# Patient Record
Sex: FEMALE | Race: WHITE | NOT HISPANIC OR LATINO
[De-identification: names, ages, dates, MRNs, and addresses within clinical notes are randomized per-mention and may not be internally consistent; named-entity substitution may affect disease eponyms.]

---

## 2017-01-09 ENCOUNTER — APPOINTMENT (OUTPATIENT)
Dept: UROLOGY | Facility: CLINIC | Age: 72
End: 2017-01-09

## 2017-01-09 DIAGNOSIS — R31.29 OTHER MICROSCOPIC HEMATURIA: ICD-10-CM

## 2017-10-05 ENCOUNTER — RESULT REVIEW (OUTPATIENT)
Age: 72
End: 2017-10-05

## 2018-06-26 ENCOUNTER — APPOINTMENT (OUTPATIENT)
Dept: INTERNAL MEDICINE | Facility: CLINIC | Age: 73
End: 2018-06-26
Payer: COMMERCIAL

## 2018-06-26 ENCOUNTER — NON-APPOINTMENT (OUTPATIENT)
Age: 73
End: 2018-06-26

## 2018-06-26 VITALS
DIASTOLIC BLOOD PRESSURE: 70 MMHG | WEIGHT: 142 LBS | TEMPERATURE: 97.6 F | BODY MASS INDEX: 23.66 KG/M2 | SYSTOLIC BLOOD PRESSURE: 130 MMHG | HEIGHT: 65 IN

## 2018-06-26 DIAGNOSIS — Z87.19 PERSONAL HISTORY OF OTHER DISEASES OF THE DIGESTIVE SYSTEM: ICD-10-CM

## 2018-06-26 DIAGNOSIS — I44.7 LEFT BUNDLE-BRANCH BLOCK, UNSPECIFIED: ICD-10-CM

## 2018-06-26 DIAGNOSIS — C44.92 SQUAMOUS CELL CARCINOMA OF SKIN, UNSPECIFIED: ICD-10-CM

## 2018-06-26 DIAGNOSIS — F41.9 ANXIETY DISORDER, UNSPECIFIED: ICD-10-CM

## 2018-06-26 DIAGNOSIS — F42.9 OBSESSIVE-COMPULSIVE DISORDER, UNSPECIFIED: ICD-10-CM

## 2018-06-26 DIAGNOSIS — Z01.818 ENCOUNTER FOR OTHER PREPROCEDURAL EXAMINATION: ICD-10-CM

## 2018-06-26 PROCEDURE — 36415 COLL VENOUS BLD VENIPUNCTURE: CPT

## 2018-06-26 PROCEDURE — 93000 ELECTROCARDIOGRAM COMPLETE: CPT

## 2018-06-26 PROCEDURE — 99214 OFFICE O/P EST MOD 30 MIN: CPT | Mod: 25

## 2018-06-26 RX ORDER — AMOXICILLIN 500 MG/1
500 CAPSULE ORAL
Qty: 21 | Refills: 0 | Status: DISCONTINUED | COMMUNITY
Start: 2018-05-15

## 2018-06-26 RX ORDER — ALPRAZOLAM 0.25 MG/1
0.25 TABLET ORAL
Refills: 0 | Status: DISCONTINUED | COMMUNITY
End: 2018-06-26

## 2018-06-26 RX ORDER — CLOMIPRAMINE HYDROCHLORIDE 50 MG/1
50 CAPSULE ORAL AT BEDTIME
Refills: 0 | Status: ACTIVE | COMMUNITY

## 2018-06-26 RX ORDER — OXYCODONE AND ACETAMINOPHEN 5; 325 MG/1; MG/1
5-325 TABLET ORAL
Qty: 10 | Refills: 0 | Status: DISCONTINUED | COMMUNITY
Start: 2018-05-15

## 2018-06-26 RX ORDER — BUSPIRONE HYDROCHLORIDE 7.5 MG/1
TABLET ORAL
Refills: 0 | Status: ACTIVE | COMMUNITY

## 2018-06-26 RX ORDER — VENLAFAXINE 37.5 MG/1
TABLET ORAL
Refills: 0 | Status: ACTIVE | COMMUNITY

## 2018-06-27 LAB
ANION GAP SERPL CALC-SCNC: 14 MMOL/L
BASOPHILS # BLD AUTO: 0.05 K/UL
BASOPHILS NFR BLD AUTO: 1 %
BUN SERPL-MCNC: 17 MG/DL
CALCIUM SERPL-MCNC: 9 MG/DL
CHLORIDE SERPL-SCNC: 99 MMOL/L
CO2 SERPL-SCNC: 26 MMOL/L
CREAT SERPL-MCNC: 0.83 MG/DL
EOSINOPHIL # BLD AUTO: 0.28 K/UL
EOSINOPHIL NFR BLD AUTO: 5.4 %
GLUCOSE SERPL-MCNC: 95 MG/DL
HCT VFR BLD CALC: 37.3 %
HGB BLD-MCNC: 11.5 G/DL
IMM GRANULOCYTES NFR BLD AUTO: 0.2 %
LYMPHOCYTES # BLD AUTO: 0.97 K/UL
LYMPHOCYTES NFR BLD AUTO: 18.6 %
MAN DIFF?: NORMAL
MCHC RBC-ENTMCNC: 25.2 PG
MCHC RBC-ENTMCNC: 30.8 GM/DL
MCV RBC AUTO: 81.6 FL
MONOCYTES # BLD AUTO: 0.32 K/UL
MONOCYTES NFR BLD AUTO: 6.1 %
NEUTROPHILS # BLD AUTO: 3.59 K/UL
NEUTROPHILS NFR BLD AUTO: 68.7 %
PLATELET # BLD AUTO: 237 K/UL
POTASSIUM SERPL-SCNC: 4 MMOL/L
RBC # BLD: 4.57 M/UL
RBC # FLD: 15.5 %
SODIUM SERPL-SCNC: 139 MMOL/L
WBC # FLD AUTO: 5.22 K/UL

## 2018-06-27 NOTE — ASSESSMENT
[Patient Optimized for Surgery] : Patient optimized for surgery [No Further Testing Recommended] : no further testing recommended [As per surgery] : as per surgery [FreeTextEntry7] : Hold NSAIDs 1 week prior

## 2018-06-27 NOTE — HISTORY OF PRESENT ILLNESS
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 She needs to schedule reconstructive surgery after medical clearance is done.

## 2018-06-27 NOTE — PHYSICAL EXAM
[No Acute Distress] : no acute distress [Well Nourished] : well nourished [Well Developed] : well developed [Well-Appearing] : well-appearing [Normal Sclera/Conjunctiva] : normal sclera/conjunctiva [PERRL] : pupils equal round and reactive to light [Normal Outer Ear/Nose] : the outer ears and nose were normal in appearance [Normal Oropharynx] : the oropharynx was normal [No JVD] : no jugular venous distention [Supple] : supple [No Lymphadenopathy] : no lymphadenopathy [No Respiratory Distress] : no respiratory distress  [Clear to Auscultation] : lungs were clear to auscultation bilaterally [No Accessory Muscle Use] : no accessory muscle use [Normal Rate] : normal rate  [Regular Rhythm] : with a regular rhythm [Normal S1, S2] : normal S1 and S2 [No Murmur] : no murmur heard [Pedal Pulses Present] : the pedal pulses are present [No Edema] : there was no peripheral edema [No Extremity Clubbing/Cyanosis] : no extremity clubbing/cyanosis [Soft] : abdomen soft [Non Tender] : non-tender [Non-distended] : non-distended [Normal Bowel Sounds] : normal bowel sounds [Normal Posterior Cervical Nodes] : no posterior cervical lymphadenopathy [Normal Anterior Cervical Nodes] : no anterior cervical lymphadenopathy [No Joint Swelling] : no joint swelling [Grossly Normal Strength/Tone] : grossly normal strength/tone [No Rash] : no rash [Normal Gait] : normal gait [Coordination Grossly Intact] : coordination grossly intact [No Focal Deficits] : no focal deficits [Deep Tendon Reflexes (DTR)] : deep tendon reflexes were 2+ and symmetric [Normal Affect] : the affect was normal [Normal Insight/Judgement] : insight and judgment were intact [de-identified] : scalp dressin gin place

## 2018-12-18 ENCOUNTER — RESULT REVIEW (OUTPATIENT)
Age: 73
End: 2018-12-18

## 2019-02-20 ENCOUNTER — NON-APPOINTMENT (OUTPATIENT)
Age: 74
End: 2019-02-20

## 2019-02-20 ENCOUNTER — LABORATORY RESULT (OUTPATIENT)
Age: 74
End: 2019-02-20

## 2019-02-20 ENCOUNTER — APPOINTMENT (OUTPATIENT)
Dept: INTERNAL MEDICINE | Facility: CLINIC | Age: 74
End: 2019-02-20
Payer: COMMERCIAL

## 2019-02-20 VITALS
WEIGHT: 157 LBS | TEMPERATURE: 98.2 F | DIASTOLIC BLOOD PRESSURE: 70 MMHG | SYSTOLIC BLOOD PRESSURE: 118 MMHG | HEART RATE: 95 BPM | OXYGEN SATURATION: 98 % | HEIGHT: 65 IN | BODY MASS INDEX: 26.16 KG/M2

## 2019-02-20 DIAGNOSIS — Z00.00 ENCOUNTER FOR GENERAL ADULT MEDICAL EXAMINATION W/OUT ABNORMAL FINDINGS: ICD-10-CM

## 2019-02-20 DIAGNOSIS — Z11.59 ENCOUNTER FOR SCREENING FOR OTHER VIRAL DISEASES: ICD-10-CM

## 2019-02-20 PROCEDURE — 93000 ELECTROCARDIOGRAM COMPLETE: CPT

## 2019-02-20 PROCEDURE — 36415 COLL VENOUS BLD VENIPUNCTURE: CPT

## 2019-02-20 PROCEDURE — 99397 PER PM REEVAL EST PAT 65+ YR: CPT | Mod: 25

## 2019-02-20 RX ORDER — LURASIDONE HYDROCHLORIDE 40 MG/1
40 TABLET, FILM COATED ORAL
Qty: 90 | Refills: 0 | Status: DISCONTINUED | COMMUNITY
Start: 2018-05-28 | End: 2019-02-20

## 2019-02-20 RX ORDER — CALCIUM CITRATE/VITAMIN D3 315MG-6.25
TABLET ORAL
Refills: 0 | Status: ACTIVE | COMMUNITY

## 2019-02-20 RX ORDER — BIMATOPROST 0.1 MG/ML
0.01 SOLUTION/ DROPS OPHTHALMIC
Refills: 0 | Status: ACTIVE | COMMUNITY

## 2019-02-20 NOTE — PLAN
[FreeTextEntry1] : Check sugar, chol, thyroid and hep C.\par HM is UTD.\par Rec cardio eval every 3 years.\par She declines any xray or PT for hip pain.

## 2019-02-20 NOTE — HISTORY OF PRESENT ILLNESS
[FreeTextEntry1] : physical [de-identified] : Saw cardio less than 2 years ago.\par Derm is UTD.\par Walks for exercise.\par Has right hip pain when lying on her back at night.  It's on the outside of her hip.  Comes/goes.  Has it once weekly.

## 2019-02-25 ENCOUNTER — RESULT REVIEW (OUTPATIENT)
Age: 74
End: 2019-02-25

## 2019-02-25 DIAGNOSIS — E03.9 HYPOTHYROIDISM, UNSPECIFIED: ICD-10-CM

## 2019-02-25 LAB
ALBUMIN SERPL ELPH-MCNC: 4.3 G/DL
ALP BLD-CCNC: 80 U/L
ALT SERPL-CCNC: 26 U/L
ANION GAP SERPL CALC-SCNC: 12 MMOL/L
APPEARANCE: CLEAR
AST SERPL-CCNC: 25 U/L
BACTERIA: NEGATIVE
BASOPHILS # BLD AUTO: 0.09 K/UL
BASOPHILS NFR BLD AUTO: 2 %
BILIRUB SERPL-MCNC: 0.3 MG/DL
BILIRUBIN URINE: NEGATIVE
BLOOD URINE: NEGATIVE
BUN SERPL-MCNC: 14 MG/DL
CALCIUM SERPL-MCNC: 9.1 MG/DL
CHLORIDE SERPL-SCNC: 106 MMOL/L
CHOLEST SERPL-MCNC: 222 MG/DL
CHOLEST/HDLC SERPL: 3.1 RATIO
CO2 SERPL-SCNC: 26 MMOL/L
COLOR: YELLOW
CREAT SERPL-MCNC: 0.69 MG/DL
EOSINOPHIL # BLD AUTO: 0.6 K/UL
EOSINOPHIL NFR BLD AUTO: 13.6 %
GLUCOSE QUALITATIVE U: NEGATIVE
GLUCOSE SERPL-MCNC: 92 MG/DL
HBA1C MFR BLD HPLC: 5.6 %
HCT VFR BLD CALC: 42.8 %
HCV AB SER QL: NONREACTIVE
HCV S/CO RATIO: 0.13 S/CO
HDLC SERPL-MCNC: 72 MG/DL
HGB BLD-MCNC: 12.4 G/DL
HYALINE CASTS: 0 /LPF
IMM GRANULOCYTES NFR BLD AUTO: 0.2 %
KETONES URINE: NEGATIVE
LDLC SERPL CALC-MCNC: 132 MG/DL
LEUKOCYTE ESTERASE URINE: NEGATIVE
LYMPHOCYTES # BLD AUTO: 0.68 K/UL
LYMPHOCYTES NFR BLD AUTO: 15.4 %
MAN DIFF?: NORMAL
MCHC RBC-ENTMCNC: 24 PG
MCHC RBC-ENTMCNC: 29 GM/DL
MCV RBC AUTO: 82.9 FL
MICROSCOPIC-UA: NORMAL
MONOCYTES # BLD AUTO: 0.4 K/UL
MONOCYTES NFR BLD AUTO: 9 %
NEUTROPHILS # BLD AUTO: 2.64 K/UL
NEUTROPHILS NFR BLD AUTO: 59.8 %
NITRITE URINE: NEGATIVE
PH URINE: 8
PLATELET # BLD AUTO: 276 K/UL
POTASSIUM SERPL-SCNC: 4.9 MMOL/L
PROT SERPL-MCNC: 6.4 G/DL
PROTEIN URINE: NORMAL
RBC # BLD: 5.16 M/UL
RBC # FLD: 14.8 %
RED BLOOD CELLS URINE: 4 /HPF
SODIUM SERPL-SCNC: 144 MMOL/L
SPECIFIC GRAVITY URINE: 1.02
SQUAMOUS EPITHELIAL CELLS: 1 /HPF
TRIGL SERPL-MCNC: 90 MG/DL
TSH SERPL-ACNC: 7.99 UIU/ML
UROBILINOGEN URINE: NORMAL
VIT B12 SERPL-MCNC: 506 PG/ML
WBC # FLD AUTO: 4.42 K/UL
WHITE BLOOD CELLS URINE: 1 /HPF

## 2019-03-11 ENCOUNTER — LABORATORY RESULT (OUTPATIENT)
Age: 74
End: 2019-03-11

## 2019-03-12 LAB
CHOLEST SERPL-MCNC: 195 MG/DL
CHOLEST/HDLC SERPL: 2.8 RATIO
ENDOMYSIUM IGA SER QL: NEGATIVE
ENDOMYSIUM IGA TITR SER: NORMAL
GLIADIN IGA SER QL: <5 UNITS
GLIADIN IGG SER QL: <5 UNITS
GLIADIN PEPTIDE IGA SER-ACNC: NEGATIVE
GLIADIN PEPTIDE IGG SER-ACNC: NEGATIVE
HDLC SERPL-MCNC: 69 MG/DL
IGA SER QL IEP: 131 MG/DL
LDLC SERPL CALC-MCNC: 118 MG/DL
T3FREE SERPL-MCNC: 2.7 PG/ML
T4 FREE SERPL-MCNC: 1.2 NG/DL
THYROGLOB AB SERPL-ACNC: <20 IU/ML
THYROPEROXIDASE AB SERPL IA-ACNC: <10 IU/ML
TRIGL SERPL-MCNC: 41 MG/DL
TSH SERPL-ACNC: 4.55 UIU/ML
TTG IGA SER IA-ACNC: <1.2 U/ML
TTG IGA SER-ACNC: NEGATIVE
TTG IGG SER IA-ACNC: 1.5 U/ML
TTG IGG SER IA-ACNC: NEGATIVE

## 2019-03-12 RX ORDER — LEVOTHYROXINE SODIUM 0.03 MG/1
25 TABLET ORAL
Qty: 30 | Refills: 5 | Status: DISCONTINUED | COMMUNITY
Start: 2019-02-25 | End: 2019-03-12

## 2019-03-19 LAB
DEPRECATED WHEAT IGE RAST QL: 0
WHEAT IGE QN: <0.1 KUA/L

## 2019-04-17 ENCOUNTER — APPOINTMENT (OUTPATIENT)
Dept: INTERNAL MEDICINE | Facility: CLINIC | Age: 74
End: 2019-04-17

## 2019-04-18 ENCOUNTER — APPOINTMENT (OUTPATIENT)
Dept: ENDOCRINOLOGY | Facility: CLINIC | Age: 74
End: 2019-04-18
Payer: COMMERCIAL

## 2019-04-18 VITALS
BODY MASS INDEX: 25.66 KG/M2 | OXYGEN SATURATION: 98 % | DIASTOLIC BLOOD PRESSURE: 82 MMHG | SYSTOLIC BLOOD PRESSURE: 134 MMHG | HEIGHT: 65 IN | HEART RATE: 89 BPM | WEIGHT: 154 LBS

## 2019-04-18 DIAGNOSIS — Z13.820 ENCOUNTER FOR SCREENING FOR OSTEOPOROSIS: ICD-10-CM

## 2019-04-18 DIAGNOSIS — Z82.0 FAMILY HISTORY OF EPILEPSY AND OTHER DISEASES OF THE NERVOUS SYSTEM: ICD-10-CM

## 2019-04-18 DIAGNOSIS — Z82.49 FAMILY HISTORY OF ISCHEMIC HEART DISEASE AND OTHER DISEASES OF THE CIRCULATORY SYSTEM: ICD-10-CM

## 2019-04-18 PROCEDURE — 99214 OFFICE O/P EST MOD 30 MIN: CPT

## 2019-04-18 RX ORDER — BIOTIN 10 MG
TABLET ORAL
Refills: 0 | Status: ACTIVE | COMMUNITY

## 2019-04-18 NOTE — CONSULT LETTER
[Dear  ___] : Dear  [unfilled], [Consult Letter:] : I had the pleasure of evaluating your patient, [unfilled]. [Sincerely,] : Sincerely, [Consult Closing:] : Thank you very much for allowing me to participate in the care of this patient.  If you have any questions, please do not hesitate to contact me. [Please see my note below.] : Please see my note below. [FreeTextEntry3] : Marysol Larsen MD

## 2019-04-18 NOTE — ASSESSMENT
[FreeTextEntry1] : Patient is a 74 yo woman here for the evaluation of subclinical hypothyroidism\par \par 1. Subclinical hypothyroid\par -patient has no significant symptoms of hypothyroidism other than dry skin. She does take biotin which may have interacted with thyroid lab assays\par -for now she prefers not to take medicines\par -note that TSH increases with age as well\par -discussed that if there are no symptoms and she feels well, can monitor off medication. Educated about symptoms of hypothyroidism including fatigue, weight gain, cold intolerance\par -repeat TFTs in 4 months, do not take biotin 2-3 days before blood work\par thyroid antibodies were negative\par \par 2. Osteoporosis, screening\par -patient has had surveillance DXA by GYN\par -asked her to sent us report\par \par Follow up in 4 months

## 2019-04-18 NOTE — HISTORY OF PRESENT ILLNESS
[FreeTextEntry1] : Patient is a 74 yo woman with depression here for the evaluation of subclinical hypothyroidism\par \par Patient had TFT's in February and TSH was 7.99.  Other than brittle nails/dry skin has no other symptoms.  Weight is stable, no constipation, bowel movements are regular, appetite is good.  No cold intolerance.  Patient is active and diet is healthy.  PCP rechecked TFT's and TSH improved to 4.55 March 12.  Levothyroxine 25 mcg was recommended but after Googling and lack of symptoms, she deferred taking medicine\par Takes on pill of biotin daily\par No exposures to lithium/amiodarone/radiation therapy \par No family of thyroid problems\par \par DXA checked by GYN "regularly" denies any history of osteoporosis.  Broke finger walking dog, but no clinical fractures

## 2019-04-18 NOTE — REVIEW OF SYSTEMS
[Fatigue] : no fatigue [Decreased Appetite] : appetite not decreased [Visual Field Defect] : no visual field defect [Blurry Vision] : no blurred vision [Chest Pain] : no chest pain [Palpitations] : no palpitations [Heart Rate Is Slow] : the heart rate was not slow [Heart Rate Is Fast] : the heart rate was not fast [Wheezing] : no wheezing was heard [Shortness Of Breath] : no shortness of breath [Cough] : no cough [SOB on Exertion] : no shortness of breath during exertion [Nausea] : no nausea [Vomiting] : no vomiting was observed [Constipation] : no constipation [Joint Pain] : no joint pain [Diarrhea] : no diarrhea [Joint Stiffness] : no joint stiffness [Headache] : no headaches [Tremors] : no tremors [Depression] : no depression [Cold Intolerance] : cold tolerant [Anxiety] : no anxiety [Negative] : ENT [Heat Intolerance] : heat tolerant [All other systems negative] : All other systems negative

## 2019-04-18 NOTE — PHYSICAL EXAM
[Alert] : alert [No Acute Distress] : no acute distress [Well Developed] : well developed [EOMI] : extra ocular movement intact [Thyroid Not Enlarged] : the thyroid was not enlarged [Normal Hearing] : hearing was normal [No Respiratory Distress] : no respiratory distress [No Accessory Muscle Use] : no accessory muscle use [Normal Rate and Effort] : normal respiratory rhythm and effort [Clear to Auscultation] : lungs were clear to auscultation bilaterally [Normal Rate] : heart rate was normal  [Regular Rhythm] : with a regular rhythm [Normal S1, S2] : normal S1 and S2 [Soft] : abdomen soft [Normal Bowel Sounds] : normal bowel sounds [Not Tender] : non-tender [No Stigmata of Cushings Syndrome] : no stigmata of cushings syndrome [No Joint Swelling] : no joint swelling seen [Normal Strength/Tone] : muscle strength and tone were normal [No Clubbing, Cyanosis] : no clubbing  or cyanosis of the fingernails [Foot Ulcers] : no foot ulcers [Normal Reflexes] : deep tendon reflexes were 2+ and symmetric [Acne] : no acne [No Tremors] : no tremors [No Motor Deficits] : the motor exam was normal [de-identified] : Dry skin

## 2019-08-27 ENCOUNTER — APPOINTMENT (OUTPATIENT)
Dept: ENDOCRINOLOGY | Facility: CLINIC | Age: 74
End: 2019-08-27
Payer: COMMERCIAL

## 2019-08-27 VITALS
WEIGHT: 156 LBS | BODY MASS INDEX: 25.99 KG/M2 | SYSTOLIC BLOOD PRESSURE: 134 MMHG | HEIGHT: 65 IN | HEART RATE: 95 BPM | OXYGEN SATURATION: 98 % | DIASTOLIC BLOOD PRESSURE: 80 MMHG

## 2019-08-27 PROCEDURE — 99214 OFFICE O/P EST MOD 30 MIN: CPT

## 2019-08-27 NOTE — ASSESSMENT
[FreeTextEntry1] : Patient is a 73 yo woman here for the evaluation of subclinical hypothyroidism\par \par 1. Subclinical hypothyroid\par -patient has no significant symptoms of hypothyroidism other than dry skin and brittle nails. Finds biotin is not helpful\par -discussed that if there are no symptoms and she feels well, can monitor off medication. Educated about symptoms of hypothyroidism including fatigue, weight gain, cold intolerance.  At today's visit, patient was amenable to started thyroid medication specifically for hair and nails\par -repeat TFTs today and if subclinical hypothyroidism persists, start Lt4 25 mcg\par \par 2. Osteopenia\par -patient has had surveillance DXA by GYN 2017\par -repeat DXA November 2020\par \par Follow up in 6 months. \par \par  [Levothyroxine] : The patient was instructed to take Levothyroxine on an empty stomach, separate from vitamins, and wait at least 30 minutes before eating

## 2019-08-27 NOTE — PHYSICAL EXAM
[No Acute Distress] : no acute distress [Alert] : alert [Well Nourished] : well nourished [EOMI] : extra ocular movement intact [Well Developed] : well developed [Normal Hearing] : hearing was normal [No Proptosis] : no proptosis [Thyroid Not Enlarged] : the thyroid was not enlarged [No Respiratory Distress] : no respiratory distress [Normal Rate and Effort] : normal respiratory rhythm and effort [No Accessory Muscle Use] : no accessory muscle use [Clear to Auscultation] : lungs were clear to auscultation bilaterally [Normal Rate] : heart rate was normal  [Normal S1, S2] : normal S1 and S2 [Normal Bowel Sounds] : normal bowel sounds [Regular Rhythm] : with a regular rhythm [Not Tender] : non-tender [Soft] : abdomen soft [Normal Gait] : normal gait [No Joint Swelling] : no joint swelling seen [Normal Affect] : the affect was normal [No Motor Deficits] : the motor exam was normal [Normal Mood] : the mood was normal [de-identified] : brittle nails

## 2019-08-27 NOTE — HISTORY OF PRESENT ILLNESS
[FreeTextEntry1] : story of Present Illness\par Patient is a 72 yo woman with depression here for the evaluation of subclinical hypothyroidism\par \par Patient had TFT's in February and TSH was 7.99. Other than brittle nails/dry skin has no other symptoms. Weight is stable, no constipation, bowel movements are regular, appetite is good. No cold intolerance. Patient is active and diet is healthy. PCP rechecked TFT's and TSH improved to 4.55 March 2019. Levothyroxine 25 mcg was recommended but after Googling and lack of symptoms, she deferred taking medicine\par Takes biotin daily but did not take it prior to this visit\par No exposures to lithium/amiodarone/radiation therapy \par No family of thyroid problems\par \par DXA checked by GYN "regularly" denies any history of osteoporosis. Broke finger walking dog, but no clinical fractures. 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

## 2019-08-27 NOTE — REVIEW OF SYSTEMS
[Negative] : Eyes [All other systems negative] : All other systems negative [Fatigue] : no fatigue [Decreased Appetite] : appetite not decreased [Dysphagia] : no dysphagia [Dysphonia] : no dysphonia [Neck Pain] : no neck pain [Nasal Congestion] : no nasal congestion [Chest Pain] : no chest pain [Palpitations] : no palpitations [Heart Rate Is Slow] : the heart rate was not slow [Heart Rate Is Fast] : the heart rate was not fast [Shortness Of Breath] : no shortness of breath [Wheezing] : no wheezing was heard [Cough] : no cough [SOB on Exertion] : no shortness of breath during exertion [Nausea] : no nausea [Vomiting] : no vomiting was observed [Constipation] : no constipation [Diarrhea] : no diarrhea [Joint Stiffness] : no joint stiffness [Joint Pain] : no joint pain [Hair Loss] : hair loss [Dry Skin] : dry skin [Headache] : no headaches [Tremors] : no tremors [Depression] : no depression [Anxiety] : no anxiety [Heat Intolerance] : heat tolerant [Cold Intolerance] : cold tolerant [de-identified] : brittle nails

## 2019-08-29 LAB
T4 FREE SERPL-MCNC: 0.9 NG/DL
TSH SERPL-ACNC: 5.54 UIU/ML

## 2019-12-15 ENCOUNTER — RESULT REVIEW (OUTPATIENT)
Age: 74
End: 2019-12-15

## 2020-02-20 ENCOUNTER — APPOINTMENT (OUTPATIENT)
Dept: ENDOCRINOLOGY | Facility: CLINIC | Age: 75
End: 2020-02-20
Payer: COMMERCIAL

## 2020-02-20 VITALS
OXYGEN SATURATION: 98 % | WEIGHT: 166 LBS | HEART RATE: 101 BPM | BODY MASS INDEX: 28.34 KG/M2 | HEIGHT: 64 IN | DIASTOLIC BLOOD PRESSURE: 82 MMHG | SYSTOLIC BLOOD PRESSURE: 140 MMHG

## 2020-02-20 DIAGNOSIS — M85.80 OTHER SPECIFIED DISORDERS OF BONE DENSITY AND STRUCTURE, UNSPECIFIED SITE: ICD-10-CM

## 2020-02-20 DIAGNOSIS — E03.9 HYPOTHYROIDISM, UNSPECIFIED: ICD-10-CM

## 2020-02-20 PROCEDURE — 99214 OFFICE O/P EST MOD 30 MIN: CPT

## 2020-02-20 RX ORDER — LEVOTHYROXINE SODIUM 0.03 MG/1
25 TABLET ORAL DAILY
Qty: 1 | Refills: 0 | Status: DISCONTINUED | COMMUNITY
Start: 2019-08-29 | End: 2020-02-20

## 2020-02-21 LAB
25(OH)D3 SERPL-MCNC: 37.4 NG/ML
ESTIMATED AVERAGE GLUCOSE: 111 MG/DL
HBA1C MFR BLD HPLC: 5.5 %
T4 FREE SERPL-MCNC: 1 NG/DL
TSH SERPL-ACNC: 5.69 UIU/ML

## 2020-02-23 NOTE — PHYSICAL EXAM
[Well Nourished] : well nourished [Alert] : alert [Well Developed] : well developed [EOMI] : extra ocular movement intact [No Proptosis] : no proptosis [Normal Hearing] : hearing was normal [Thyroid Not Enlarged] : the thyroid was not enlarged [No Thyroid Nodules] : there were no palpable thyroid nodules [No Respiratory Distress] : no respiratory distress [Normal Rate and Effort] : normal respiratory rhythm and effort [No Accessory Muscle Use] : no accessory muscle use [Clear to Auscultation] : lungs were clear to auscultation bilaterally [Normal Rate] : heart rate was normal  [Normal S1, S2] : normal S1 and S2 [Regular Rhythm] : with a regular rhythm [Not Tender] : non-tender [Normal Bowel Sounds] : normal bowel sounds [Normal Gait] : normal gait [No Joint Swelling] : no joint swelling seen [Soft] : abdomen soft [Normal Strength/Tone] : muscle strength and tone were normal [Normal Insight/Judgement] : insight and judgment were intact [No Motor Deficits] : the motor exam was normal [Normal Affect] : the affect was normal [Normal Mood] : the mood was normal [de-identified] : Dry skin and brittle nails

## 2020-02-23 NOTE — ASSESSMENT
[FreeTextEntry1] : Patient is a 75 yo woman with subclinical hypothyroidism and osteopenia\par \par 1. Subclinical hypothyroid\par -the patient still states symptoms of decrease hair and brittle nails. She was started on Lt4 25 mcg to see if there would be symptomatic improvement but states no changes in symptoms\par -repeat thyroid levels today and will do a trial off of levothyroxine as patient prefers to be on medicines that are essential only\par -repeat thyroid function tests in 3-4 months or sooner should there be interval symptoms\par \par 2. Osteopenia\par -she is without interval fractures\par -states she takes calcium + d for supplementation\par -she requires repeat bone density November 2020 as the last DXA which was reviewed was in 2017\par \par Follow up in four months

## 2020-02-23 NOTE — HISTORY OF PRESENT ILLNESS
[FreeTextEntry1] : Patient is a 75 yo woman with depression here for the evaluation of subclinical hypothyroidism\par \par Patient had TFT's in February 2019 which showed that her TSH was 7.99. Other than brittle nails/dry skin has no other symptoms. Weight is stable, no constipation, bowel movements are regular, appetite is good. No cold intolerance. Patient is active and diet is healthy. PCP rechecked TFT's and TSH improved to 4.55 March 2019. Levothyroxine 25 mcg was recommended but after Googling and lack of symptoms, she deferred taking medicine. Was amenable to starting levothyroxine 25 mcg at the last visit\par Takes biotin daily but did not seem to help so stopped about one week ago\par No exposures to lithium/amiodarone/radiation therapy \par No family of thyroid problems\par \par DXA checked by GYN "regularly" denies any history of osteoporosis. Broke finger walking dog, but no clinical fractures. Received DXA from November 2017\par LS Spine T score +0.2\par L FN -1.5\par R FN -1.1\par L total -1.0\par R total -0.6\par No interval fractures\par Takes calcium and vitamin D supplements

## 2020-02-23 NOTE — REVIEW OF SYSTEMS
[Negative] : Eyes [Anxiety] : anxiety [Fatigue] : no fatigue [Decreased Appetite] : appetite not decreased [Dysphonia] : no dysphonia [Dysphagia] : no dysphagia [Chest Pain] : no chest pain [Palpitations] : no palpitations [Shortness Of Breath] : no shortness of breath [Heart Rate Is Slow] : the heart rate was not slow [Heart Rate Is Fast] : the heart rate was not fast [Wheezing] : no wheezing was heard [Cough] : no cough [SOB on Exertion] : no shortness of breath during exertion [Nausea] : no nausea [Vomiting] : no vomiting was observed [Constipation] : no constipation [Diarrhea] : no diarrhea [Depression] : no depression

## 2020-07-17 ENCOUNTER — APPOINTMENT (OUTPATIENT)
Dept: ENDOCRINOLOGY | Facility: CLINIC | Age: 75
End: 2020-07-17

## 2020-08-11 ENCOUNTER — RESULT REVIEW (OUTPATIENT)
Age: 75
End: 2020-08-11

## 2023-03-06 ENCOUNTER — OFFICE (OUTPATIENT)
Dept: URBAN - METROPOLITAN AREA CLINIC 109 | Facility: CLINIC | Age: 78
Setting detail: OPHTHALMOLOGY
End: 2023-03-06
Payer: MEDICARE

## 2023-03-06 DIAGNOSIS — H26.493: ICD-10-CM

## 2023-03-06 DIAGNOSIS — H40.1131: ICD-10-CM

## 2023-03-06 DIAGNOSIS — H16.221: ICD-10-CM

## 2023-03-06 PROCEDURE — 92014 COMPRE OPH EXAM EST PT 1/>: CPT | Performed by: OPHTHALMOLOGY

## 2023-03-06 ASSESSMENT — PACHYMETRY
OS_CT_UM: 519
OS_CT_CORRECTION: 2
OD_CT_CORRECTION: -1
OD_CT_UM: 550

## 2023-03-06 ASSESSMENT — KERATOMETRY
OD_K2POWER_DIOPTERS: 43.62
OS_K2POWER_DIOPTERS: 43.25
OD_AXISANGLE_DEGREES: 73
OS_AXISANGLE_DEGREES: 78
OS_K1POWER_DIOPTERS: 42.75
OD_K1POWER_DIOPTERS: 43.00

## 2023-03-06 ASSESSMENT — REFRACTION_MANIFEST
OS_SPHERE: PLANO
OD_ADD: +2.50
OS_AXIS: 180
OS_CYLINDER: -0.50
OD_VA1: 20/20
OS_ADD: +2.50
OD_SPHERE: -0.50

## 2023-03-06 ASSESSMENT — VISUAL ACUITY
OD_BCVA: 20/20-2
OS_BCVA: 20/20-2

## 2023-03-06 ASSESSMENT — REFRACTION_CURRENTRX
OS_OVR_VA: 20/
OD_ADD: +1.75
OS_AXIS: 88
OS_CYLINDER: -0.50
OD_OVR_VA: 20/
OS_SPHERE: -5.50
OD_SPHERE: -4.50
OS_ADD: +1.75

## 2023-03-06 ASSESSMENT — SPHEQUIV_DERIVED
OS_SPHEQUIV: 0
OD_SPHEQUIV: -0.5

## 2023-03-06 ASSESSMENT — TONOMETRY
OS_IOP_MMHG: 15
OD_IOP_MMHG: 15

## 2023-03-06 ASSESSMENT — AXIALLENGTH_DERIVED
OD_AL: 23.8595
OS_AL: 23.777

## 2023-03-06 ASSESSMENT — REFRACTION_AUTOREFRACTION
OD_AXIS: 140
OS_CYLINDER: -0.50
OD_CYLINDER: -0.50
OD_SPHERE: -0.25
OS_SPHERE: +0.25
OS_AXIS: 176

## 2023-03-06 ASSESSMENT — SUPERFICIAL PUNCTATE KERATITIS (SPK): OD_SPK: T

## 2023-03-06 ASSESSMENT — CONFRONTATIONAL VISUAL FIELD TEST (CVF)
OD_FINDINGS: FULL
OS_FINDINGS: FULL

## 2023-09-05 ENCOUNTER — APPOINTMENT (OUTPATIENT)
Dept: ORTHOPEDIC SURGERY | Facility: CLINIC | Age: 78
End: 2023-09-05
Payer: MEDICARE

## 2023-09-05 VITALS — HEIGHT: 64 IN | BODY MASS INDEX: 28.17 KG/M2 | WEIGHT: 165 LBS

## 2023-09-05 DIAGNOSIS — M70.61 TROCHANTERIC BURSITIS, RIGHT HIP: ICD-10-CM

## 2023-09-05 DIAGNOSIS — M17.12 UNILATERAL PRIMARY OSTEOARTHRITIS, LEFT KNEE: ICD-10-CM

## 2023-09-05 PROCEDURE — 73562 X-RAY EXAM OF KNEE 3: CPT | Mod: LT

## 2023-09-05 PROCEDURE — 99204 OFFICE O/P NEW MOD 45 MIN: CPT

## 2023-09-05 PROCEDURE — 73503 X-RAY EXAM HIP UNI 4/> VIEWS: CPT | Mod: RT

## 2023-09-05 RX ORDER — MELOXICAM 15 MG/1
15 TABLET ORAL
Qty: 30 | Refills: 1 | Status: ACTIVE | COMMUNITY
Start: 2023-09-05 | End: 1900-01-01

## 2023-09-05 NOTE — IMAGING
[de-identified] : LEFT KNEE No Effusion Valgus deformity +Medial joint line tenderness +Lateral joint line tenderness ROM 0-120 5/5 Strength NVI   LEFT RIGHT HIP (-) Groin tenderness + Greater troch bursa tenderness (-) Buttock tenderness  Good ROM  Non-antalgic gait w/o assistance  [Right] : right hip with pelvis [There are no fractures, subluxations or dislocations. No significant abnormalities are seen] : There are no fractures, subluxations or dislocations. No significant abnormalities are seen [Left] : left knee [advanced tricompartmental OA with lateral compartment narrowing and valgus alignment] : advanced tricompartmental OA with lateral compartment narrowing and valgus alignment

## 2023-09-05 NOTE — DISCUSSION/SUMMARY
[de-identified] : The patient was advised of the diagnosis.  The natural history of the pathology was explained in full to the patient in layman's terms. All questions were answered.  The risks and benefits of surgical and non-surgical treatment alternatives were explained in full to the patient.  The natural progression of Osteoarthritis was explained to the patient.  We discussed the possible treatment options from conservative to operative.  These included NSAIDS, Glucosamine and Chondrotin sulfate, and Physical Therapy as well different types of injections.  We also discussed that at some point they may progress to needed a TKA.  Information and pamphlets were given.   Progress note completed by Cesia Agarwal PA-C

## 2023-09-05 NOTE — HISTORY OF PRESENT ILLNESS
[10] : 10 [6] : 6 [Dull/Aching] : dull/aching [de-identified] : 9/5/23: 77yo F with left knee and right hip pain for the past few months with no injury. States she had a flare-up of pain a couple weeks ago but is doing better today. She has tried aspirin with mild relief. Most bothersome with startup and stairs. No formal tx to date.  [FreeTextEntry5] : pain started about 2022 pain worse stairs gets popping has tried aspirin

## 2023-09-05 NOTE — ASSESSMENT
[FreeTextEntry1] : 9/5/23: Adv left knee OA, right hip troch bursitis. Hip pain is likely aggravated from her altered gait due to the knee OA. Discussed anti-inflammatories, PT/HEP, CSI, visco injections, and briefly TKA. She is well informed and would like to proceed with Mobic and PT/HEP at this point. f/up in 6-8 weeks

## 2023-09-29 ENCOUNTER — OFFICE (OUTPATIENT)
Dept: URBAN - METROPOLITAN AREA CLINIC 109 | Facility: CLINIC | Age: 78
Setting detail: OPHTHALMOLOGY
End: 2023-09-29
Payer: MEDICARE

## 2023-09-29 DIAGNOSIS — H40.1131: ICD-10-CM

## 2023-09-29 DIAGNOSIS — H16.221: ICD-10-CM

## 2023-09-29 DIAGNOSIS — H26.493: ICD-10-CM

## 2023-09-29 PROCEDURE — 76514 ECHO EXAM OF EYE THICKNESS: CPT | Performed by: OPHTHALMOLOGY

## 2023-09-29 PROCEDURE — 92133 CPTRZD OPH DX IMG PST SGM ON: CPT | Performed by: OPHTHALMOLOGY

## 2023-09-29 PROCEDURE — 92083 EXTENDED VISUAL FIELD XM: CPT | Performed by: OPHTHALMOLOGY

## 2023-09-29 PROCEDURE — 99213 OFFICE O/P EST LOW 20 MIN: CPT | Performed by: OPHTHALMOLOGY

## 2023-09-29 ASSESSMENT — PACHYMETRY
OS_CT_UM: 519
OD_CT_UM: 550
OD_CT_CORRECTION: -1
OS_CT_CORRECTION: 2

## 2023-09-29 ASSESSMENT — AXIALLENGTH_DERIVED
OD_AL: 23.455
OS_AL: 23.4921

## 2023-09-29 ASSESSMENT — REFRACTION_MANIFEST
OS_SPHERE: PLANO
OS_ADD: +2.50
OD_ADD: +2.50
OS_AXIS: 180
OD_SPHERE: -0.50
OD_VA1: 20/20
OS_CYLINDER: -0.50

## 2023-09-29 ASSESSMENT — TONOMETRY
OS_IOP_MMHG: 15
OD_IOP_MMHG: 15

## 2023-09-29 ASSESSMENT — CONFRONTATIONAL VISUAL FIELD TEST (CVF)
OS_FINDINGS: FULL
OD_FINDINGS: FULL

## 2023-09-29 ASSESSMENT — SPHEQUIV_DERIVED
OD_SPHEQUIV: 0
OS_SPHEQUIV: 0.375

## 2023-09-29 ASSESSMENT — KERATOMETRY
OS_K2POWER_DIOPTERS: 43.75
OS_K1POWER_DIOPTERS: 43.00
OD_AXISANGLE_DEGREES: 095
OD_K1POWER_DIOPTERS: 43.50
OS_AXISANGLE_DEGREES: 084
OD_K2POWER_DIOPTERS: 44.25

## 2023-09-29 ASSESSMENT — REFRACTION_CURRENTRX
OS_OVR_VA: 20/
OS_CYLINDER: -0.50
OD_SPHERE: -4.50
OD_OVR_VA: 20/
OS_AXIS: 88
OS_ADD: +1.75
OS_SPHERE: -5.50
OD_ADD: +1.75

## 2023-09-29 ASSESSMENT — VISUAL ACUITY
OD_BCVA: 20/20-1
OS_BCVA: 20/20-2

## 2023-09-29 ASSESSMENT — REFRACTION_AUTOREFRACTION
OS_AXIS: 165
OD_CYLINDER: -0.50
OS_CYLINDER: -0.75
OS_SPHERE: +0.75
OD_SPHERE: +0.25
OD_AXIS: 173

## 2023-09-29 ASSESSMENT — SUPERFICIAL PUNCTATE KERATITIS (SPK): OD_SPK: T

## 2023-10-17 ENCOUNTER — APPOINTMENT (OUTPATIENT)
Dept: ORTHOPEDIC SURGERY | Facility: CLINIC | Age: 78
End: 2023-10-17

## 2024-01-29 ENCOUNTER — APPOINTMENT (OUTPATIENT)
Dept: ORTHOPEDIC SURGERY | Facility: CLINIC | Age: 79
End: 2024-01-29

## 2024-04-24 ENCOUNTER — OFFICE (OUTPATIENT)
Dept: URBAN - METROPOLITAN AREA CLINIC 109 | Facility: CLINIC | Age: 79
Setting detail: OPHTHALMOLOGY
End: 2024-04-24
Payer: MEDICARE

## 2024-04-24 DIAGNOSIS — H26.493: ICD-10-CM

## 2024-04-24 DIAGNOSIS — H16.221: ICD-10-CM

## 2024-04-24 DIAGNOSIS — H40.1131: ICD-10-CM

## 2024-04-24 PROCEDURE — 92014 COMPRE OPH EXAM EST PT 1/>: CPT | Performed by: OPHTHALMOLOGY

## 2024-04-24 PROCEDURE — 92133 CPTRZD OPH DX IMG PST SGM ON: CPT | Performed by: OPHTHALMOLOGY

## 2024-10-08 ENCOUNTER — OFFICE (OUTPATIENT)
Dept: URBAN - METROPOLITAN AREA CLINIC 109 | Facility: CLINIC | Age: 79
Setting detail: OPHTHALMOLOGY
End: 2024-10-08
Payer: MEDICARE

## 2024-10-08 DIAGNOSIS — S05.02XA: ICD-10-CM

## 2024-10-08 PROCEDURE — 99213 OFFICE O/P EST LOW 20 MIN: CPT | Performed by: OPHTHALMOLOGY

## 2024-10-08 ASSESSMENT — VISUAL ACUITY
OS_BCVA: 20/25-2
OD_BCVA: 20/

## 2024-10-08 ASSESSMENT — REFRACTION_CURRENTRX
OS_OVR_VA: 20/
OD_OVR_VA: 20/
OS_CYLINDER: -0.50
OS_ADD: +1.75
OS_SPHERE: -5.50
OD_SPHERE: -4.50
OS_AXIS: 88
OD_ADD: +1.75

## 2024-10-08 ASSESSMENT — CONFRONTATIONAL VISUAL FIELD TEST (CVF)
OD_FINDINGS: FULL
OS_FINDINGS: FULL

## 2024-10-08 ASSESSMENT — REFRACTION_MANIFEST
OD_SPHERE: -0.50
OS_AXIS: 180
OS_SPHERE: PLANO
OD_ADD: +2.50
OS_CYLINDER: -0.50
OS_ADD: +2.50
OD_VA1: 20/20

## 2024-10-08 ASSESSMENT — TONOMETRY: OD_IOP_MMHG: 15

## 2024-10-08 ASSESSMENT — PACHYMETRY
OS_CT_UM: 519
OD_CT_UM: 550
OS_CT_CORRECTION: 2
OD_CT_CORRECTION: -1

## 2024-10-08 ASSESSMENT — KERATOMETRY
OD_K2POWER_DIOPTERS: 44.25
OD_K1POWER_DIOPTERS: 43.50
OD_AXISANGLE_DEGREES: 095
OS_K2POWER_DIOPTERS: 43.75
OS_AXISANGLE_DEGREES: 084
OS_K1POWER_DIOPTERS: 43.00

## 2024-10-08 ASSESSMENT — SUPERFICIAL PUNCTATE KERATITIS (SPK): OD_SPK: T

## 2024-10-08 ASSESSMENT — CORNEAL TRAUMA - ABRASION: OS_ABRASION: PRESENT

## 2024-10-08 ASSESSMENT — REFRACTION_AUTOREFRACTION
OD_SPHERE: PLANO
OD_AXIS: 169
OD_CYLINDER: -1.75

## 2024-10-10 ENCOUNTER — RX ONLY (RX ONLY)
Age: 79
End: 2024-10-10

## 2024-10-10 ENCOUNTER — OFFICE (OUTPATIENT)
Dept: URBAN - METROPOLITAN AREA CLINIC 109 | Facility: CLINIC | Age: 79
Setting detail: OPHTHALMOLOGY
End: 2024-10-10
Payer: MEDICARE

## 2024-10-10 DIAGNOSIS — S05.02XD: ICD-10-CM

## 2024-10-10 PROBLEM — T15.12XA FOREIGN BODY, CONJUNCTIVAL; LEFT EYE INITIAL ENCOUNTER: Status: RESOLVED | Noted: 2024-10-08 | Resolved: 2024-10-10

## 2024-10-10 PROCEDURE — 99213 OFFICE O/P EST LOW 20 MIN: CPT | Performed by: OPHTHALMOLOGY

## 2024-10-10 ASSESSMENT — REFRACTION_MANIFEST
OD_ADD: +2.50
OS_AXIS: 180
OS_CYLINDER: -0.50
OS_ADD: +2.50
OD_SPHERE: -0.50
OS_SPHERE: PLANO
OD_VA1: 20/20

## 2024-10-10 ASSESSMENT — REFRACTION_AUTOREFRACTION
OD_AXIS: 169
OD_SPHERE: PLANO
OD_CYLINDER: -1.75

## 2024-10-10 ASSESSMENT — REFRACTION_CURRENTRX
OS_OVR_VA: 20/
OD_OVR_VA: 20/
OS_SPHERE: -5.50
OD_ADD: +1.75
OS_ADD: +1.75
OS_CYLINDER: -0.50
OD_SPHERE: -4.50
OS_AXIS: 88

## 2024-10-10 ASSESSMENT — VISUAL ACUITY
OS_BCVA: 20/20-1
OD_BCVA: 20/20

## 2024-10-10 ASSESSMENT — KERATOMETRY
OS_AXISANGLE_DEGREES: 084
OD_K1POWER_DIOPTERS: 43.50
OD_K2POWER_DIOPTERS: 44.25
OS_K1POWER_DIOPTERS: 43.00
OD_AXISANGLE_DEGREES: 095
OS_K2POWER_DIOPTERS: 43.75

## 2024-10-10 ASSESSMENT — CORNEAL TRAUMA - ABRASION: OS_ABRASION: ABSENT

## 2024-10-10 ASSESSMENT — CONFRONTATIONAL VISUAL FIELD TEST (CVF)
OD_FINDINGS: FULL
OS_FINDINGS: FULL

## 2024-10-10 ASSESSMENT — SUPERFICIAL PUNCTATE KERATITIS (SPK): OD_SPK: T

## 2024-10-23 ENCOUNTER — OFFICE (OUTPATIENT)
Dept: URBAN - METROPOLITAN AREA CLINIC 109 | Facility: CLINIC | Age: 79
Setting detail: OPHTHALMOLOGY
End: 2024-10-23
Payer: MEDICARE

## 2024-10-23 DIAGNOSIS — H40.1131: ICD-10-CM

## 2024-10-23 DIAGNOSIS — H16.221: ICD-10-CM

## 2024-10-23 PROBLEM — S05.02XD CORNEAL ABRASION; SUBSEQUENT ENCOUNTER  LEFT EYE: Status: RESOLVED | Noted: 2024-10-10 | Resolved: 2024-10-23

## 2024-10-23 PROCEDURE — 92133 CPTRZD OPH DX IMG PST SGM ON: CPT | Performed by: OPHTHALMOLOGY

## 2024-10-23 PROCEDURE — 99213 OFFICE O/P EST LOW 20 MIN: CPT | Performed by: OPHTHALMOLOGY

## 2024-10-23 PROCEDURE — 92083 EXTENDED VISUAL FIELD XM: CPT | Performed by: OPHTHALMOLOGY

## 2024-10-23 PROCEDURE — 92020 GONIOSCOPY: CPT | Performed by: OPHTHALMOLOGY

## 2024-10-23 ASSESSMENT — VISUAL ACUITY
OS_BCVA: 20/20-2
OD_BCVA: 20/20-1

## 2024-10-23 ASSESSMENT — REFRACTION_CURRENTRX
OD_SPHERE: -4.50
OS_AXIS: 88
OD_ADD: +1.75
OD_OVR_VA: 20/
OS_OVR_VA: 20/
OS_SPHERE: -5.50
OS_ADD: +1.75
OS_CYLINDER: -0.50

## 2024-10-23 ASSESSMENT — REFRACTION_MANIFEST
OS_CYLINDER: -0.50
OS_SPHERE: PLANO
OD_ADD: +2.50
OD_VA1: 20/20
OS_ADD: +2.50
OS_AXIS: 180
OD_SPHERE: -0.50

## 2024-10-23 ASSESSMENT — TONOMETRY
OS_IOP_MMHG: 17
OD_IOP_MMHG: 16

## 2024-10-23 ASSESSMENT — PACHYMETRY
OS_CT_CORRECTION: 2
OS_CT_UM: 519
OD_CT_CORRECTION: -1
OD_CT_UM: 550

## 2024-10-23 ASSESSMENT — REFRACTION_AUTOREFRACTION
OD_CYLINDER: -1.75
OD_AXIS: 169
OD_SPHERE: PLANO

## 2024-10-23 ASSESSMENT — KERATOMETRY
OS_AXISANGLE_DEGREES: 084
OD_AXISANGLE_DEGREES: 095
OD_K2POWER_DIOPTERS: 44.25
OD_K1POWER_DIOPTERS: 43.50
OS_K2POWER_DIOPTERS: 43.75
OS_K1POWER_DIOPTERS: 43.00

## 2024-10-23 ASSESSMENT — CONFRONTATIONAL VISUAL FIELD TEST (CVF)
OD_FINDINGS: FULL
OS_FINDINGS: FULL

## 2024-10-23 ASSESSMENT — SUPERFICIAL PUNCTATE KERATITIS (SPK): OD_SPK: T

## 2025-04-23 ENCOUNTER — OFFICE (OUTPATIENT)
Dept: URBAN - METROPOLITAN AREA CLINIC 109 | Facility: CLINIC | Age: 80
Setting detail: OPHTHALMOLOGY
End: 2025-04-23
Payer: MEDICARE

## 2025-04-23 DIAGNOSIS — H40.1131: ICD-10-CM

## 2025-04-23 DIAGNOSIS — H16.221: ICD-10-CM

## 2025-04-23 PROCEDURE — 92133 CPTRZD OPH DX IMG PST SGM ON: CPT | Performed by: OPHTHALMOLOGY

## 2025-04-23 PROCEDURE — 99213 OFFICE O/P EST LOW 20 MIN: CPT | Performed by: OPHTHALMOLOGY

## 2025-04-23 ASSESSMENT — KERATOMETRY
OD_K1POWER_DIOPTERS: 43.75
OD_AXISANGLE_DEGREES: 076
OD_K2POWER_DIOPTERS: 44.00
OS_AXISANGLE_DEGREES: 080
OS_K2POWER_DIOPTERS: 43.75
OS_K1POWER_DIOPTERS: 42.75

## 2025-04-23 ASSESSMENT — REFRACTION_MANIFEST
OS_CYLINDER: -0.50
OS_AXIS: 180
OD_SPHERE: -0.50
OS_ADD: +2.50
OS_SPHERE: PLANO
OD_ADD: +2.50
OD_VA1: 20/20

## 2025-04-23 ASSESSMENT — REFRACTION_CURRENTRX
OD_ADD: +1.75
OS_OVR_VA: 20/
OS_AXIS: 88
OS_CYLINDER: -0.50
OS_SPHERE: -5.50
OD_OVR_VA: 20/
OS_ADD: +1.75
OD_SPHERE: -4.50

## 2025-04-23 ASSESSMENT — REFRACTION_AUTOREFRACTION
OD_CYLINDER: -0.25
OD_AXIS: 134
OS_CYLINDER: -0.75
OD_SPHERE: -0.25
OS_SPHERE: +0.50
OS_AXIS: 159

## 2025-04-23 ASSESSMENT — PACHYMETRY
OD_CT_CORRECTION: -1
OS_CT_UM: 519
OD_CT_UM: 550
OS_CT_CORRECTION: 2

## 2025-04-23 ASSESSMENT — SUPERFICIAL PUNCTATE KERATITIS (SPK): OD_SPK: T

## 2025-04-23 ASSESSMENT — TONOMETRY
OS_IOP_MMHG: 13
OD_IOP_MMHG: 13

## 2025-04-23 ASSESSMENT — VISUAL ACUITY
OS_BCVA: 20/30-2
OD_BCVA: 20/25-1

## 2025-04-23 ASSESSMENT — CONFRONTATIONAL VISUAL FIELD TEST (CVF)
OD_FINDINGS: FULL
OS_FINDINGS: FULL